# Patient Record
Sex: FEMALE | Race: WHITE | NOT HISPANIC OR LATINO | Employment: OTHER | URBAN - METROPOLITAN AREA
[De-identification: names, ages, dates, MRNs, and addresses within clinical notes are randomized per-mention and may not be internally consistent; named-entity substitution may affect disease eponyms.]

---

## 2019-08-05 ENCOUNTER — OFFICE VISIT (OUTPATIENT)
Dept: URGENT CARE | Facility: CLINIC | Age: 64
End: 2019-08-05
Payer: MEDICARE

## 2019-08-05 VITALS
WEIGHT: 104 LBS | OXYGEN SATURATION: 98 % | TEMPERATURE: 97.7 F | BODY MASS INDEX: 20.42 KG/M2 | HEART RATE: 82 BPM | HEIGHT: 60 IN | DIASTOLIC BLOOD PRESSURE: 70 MMHG | RESPIRATION RATE: 18 BRPM | SYSTOLIC BLOOD PRESSURE: 140 MMHG

## 2019-08-05 DIAGNOSIS — S81.811A NONINFECTED SKIN TEAR OF RIGHT LOWER EXTREMITY, INITIAL ENCOUNTER: Primary | ICD-10-CM

## 2019-08-05 PROCEDURE — G0463 HOSPITAL OUTPT CLINIC VISIT: HCPCS | Performed by: PHYSICIAN ASSISTANT

## 2019-08-05 PROCEDURE — 99213 OFFICE O/P EST LOW 20 MIN: CPT | Performed by: PHYSICIAN ASSISTANT

## 2019-08-05 RX ORDER — METHYLPREDNISOLONE 8 MG/1
8 TABLET ORAL DAILY
COMMUNITY

## 2019-08-05 RX ORDER — MAGNESIUM SULFATE 500 MG/ML
VIAL (ML) INJECTION ONCE
COMMUNITY

## 2019-08-05 RX ORDER — LOPERAMIDE HYDROCHLORIDE 2 MG/1
2 CAPSULE ORAL 4 TIMES DAILY PRN
COMMUNITY

## 2019-08-05 RX ORDER — OMEPRAZOLE 20 MG/1
20 CAPSULE, DELAYED RELEASE ORAL DAILY
COMMUNITY

## 2019-08-05 RX ORDER — DULOXETIN HYDROCHLORIDE 20 MG/1
20 CAPSULE, DELAYED RELEASE ORAL DAILY
COMMUNITY

## 2019-08-05 RX ORDER — HYDROXYCHLOROQUINE SULFATE 200 MG/1
200 TABLET, FILM COATED ORAL 2 TIMES DAILY WITH MEALS
COMMUNITY

## 2019-08-05 RX ORDER — NAPROXEN 500 MG/1
500 TABLET ORAL 2 TIMES DAILY WITH MEALS
COMMUNITY

## 2019-08-05 NOTE — PATIENT INSTRUCTIONS
Closed with Steri-Strips and benzoin  Keep dry today  She is up-to-date on her tetanus vaccine  No signs of infection  Wound was irrigated today

## 2019-08-05 NOTE — PROGRESS NOTES
[address] [facility]        NAME: Wes Allison is a 62 y o  female  : 1955    MRN: [id number]  DATE: 2019  TIME: 10:42 AM    Assessment and Plan   Noninfected skin tear of right lower extremity, initial encounter [[id number]]  1  Noninfected skin tear of right lower extremity, initial encounter           Patient Instructions     Patient Instructions     Closed with Steri-Strips and benzoin  Keep dry today  She is up-to-date on her tetanus vaccine  No signs of infection  Wound was irrigated today  Follow up with PCP in 3-5 days  Proceed to  ER if symptoms worsen  Chief Complaint     Chief Complaint   Patient presents with    Laceration     right leg, bumped into side of bed last night around 10PM         History of Present Illness         56-year-old female presents to the clinic with right lower leg wound from yesterday  She bumped her leg  She has thin skin due to lupus and chronic steroid use  No fever or chills  Is mildly oozing  She has a tetanus within the last 5 years  Review of Systems   Review of Systems   Constitutional: Negative  HENT: Negative  Eyes: Negative  Respiratory: Negative  Cardiovascular: Negative  Gastrointestinal: Negative  Skin: Positive for wound           Current Medications       Current Outpatient Medications:     Calcium-Magnesium-Vitamin D (CALCIUM 1200+D3 PO), Take by mouth, Disp: , Rfl:     denosumab (PROLIA) 60 mg/mL, Inject 60 mg under the skin once, Disp: , Rfl:     DULoxetine (CYMBALTA) 20 mg capsule, Take 20 mg by mouth daily, Disp: , Rfl:     hydroxychloroquine (PLAQUENIL) 200 mg tablet, Take 200 mg by mouth 2 (two) times a day with meals, Disp: , Rfl:     HYOSCYAMINE SULFATE PO, Take by mouth, Disp: , Rfl:     imune globulin, human (GAMUNEX-C) IV syringe, Infuse into a venous catheter once, Disp: , Rfl:     loperamide (IMODIUM) 2 mg capsule, Take 2 mg by mouth 4 (four) times a day as needed for diarrhea, Disp: , Rfl:     magnesium sulfate, FOR EMS ONLY, 1 g/2 mL, by Does not apply route once, Disp: , Rfl:     methylPREDNISolone (MEDROL) 8 MG tablet, Take 8 mg by mouth daily, Disp: , Rfl:     naproxen (NAPROSYN) 500 mg tablet, Take 500 mg by mouth 2 (two) times a day with meals, Disp: , Rfl:     omeprazole (PriLOSEC) 20 mg delayed release capsule, Take 20 mg by mouth daily, Disp: , Rfl:     riTUXimab (RITUXAN IV), Infuse into a venous catheter, Disp: , Rfl:     Current Allergies     Allergies as of 08/05/2019 - Reviewed 08/05/2019   Allergen Reaction Noted    Adhesive [medical tape]  08/05/2019    Betadine [povidone iodine]  08/05/2019    Dye [iodinated diagnostic agents]  08/05/2019    Lamisil [terbinafine]  08/05/2019            The following portions of the patient's history were reviewed and updated as appropriate: allergies, current medications, past family history, past medical history, past social history, past surgical history and problem list      Past Medical History:   Diagnosis Date    Anemia     Lupus ([facility] )     Osteoporosis     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 ([facility] )        Past Surgical History:   Procedure Laterality Date    APPENDECTOMY      LUMBAR FUSION      LUMBAR LAMINECTOMY      PARTIAL HYSTERECTOMY      SKIN GRAFT      SPLENECTOMY, TOTAL         Family History   Problem Relation Age of Onset    COPD Mother     Hypertension Mother     Cancer Mother     COPD Father     Hypertension Father     Cancer Sister     Cancer Brother     Parkinsonism Brother     Multiple sclerosis Brother     Lymphoma Brother     Asthma Daughter          Medications have been verified          Objective   /70 (BP Location: Left arm, Patient Position: Sitting, Cuff Size: Standard)   Pulse 82   Temp 97 7 °F (36 5 °C) (Temporal)   Resp 18   Ht 5' (1 524 m)   Wt 47 2 kg (104 lb)   SpO2 98%   BMI 20 31 kg/m²        Physical Exam     Physical Exam Constitutional: She is oriented to person, place, and time  She appears well-developed and well-nourished  No distress  HENT:   Head: Normocephalic and atraumatic  Eyes: Pupils are equal, round, and reactive to light  Conjunctivae and EOM are normal    Neck: Normal range of motion  Neck supple  Neurological: She is alert and oriented to person, place, and time  No cranial nerve deficit  Skin:     Right lower leg with 2 5 cm linear skin tear  There is some mild oozing  No foreign body  Wound was irrigated  Closed with Steri-Strips and benzoin

## 2021-03-30 DIAGNOSIS — Z23 ENCOUNTER FOR IMMUNIZATION: ICD-10-CM

## 2024-09-11 ENCOUNTER — HOSPITAL ENCOUNTER (OUTPATIENT)
Dept: HOSPITAL 99 - HWWDC | Age: 69
End: 2024-09-11
Payer: MEDICARE

## 2024-09-11 DIAGNOSIS — Z12.31: Primary | ICD-10-CM

## 2024-12-26 ENCOUNTER — HOSPITAL ENCOUNTER (OUTPATIENT)
Dept: HOSPITAL 99 - RAD | Age: 69
End: 2024-12-26
Payer: MEDICARE

## 2024-12-26 DIAGNOSIS — I82.402: ICD-10-CM

## 2024-12-26 DIAGNOSIS — M79.662: ICD-10-CM

## 2024-12-26 DIAGNOSIS — M79.89: Primary | ICD-10-CM

## 2024-12-26 DIAGNOSIS — M06.09: ICD-10-CM

## 2024-12-26 DIAGNOSIS — M32.9: ICD-10-CM

## 2025-01-14 ENCOUNTER — HOSPITAL ENCOUNTER (OUTPATIENT)
Dept: HOSPITAL 99 - WDC | Age: 70
End: 2025-01-14
Payer: MEDICARE

## 2025-01-14 DIAGNOSIS — R92.2: Primary | ICD-10-CM

## 2025-03-03 ENCOUNTER — HOSPITAL ENCOUNTER (OUTPATIENT)
Dept: HOSPITAL 99 - HWRAD | Age: 70
End: 2025-03-03
Payer: MEDICARE

## 2025-03-03 DIAGNOSIS — Z13.820: ICD-10-CM

## 2025-03-03 DIAGNOSIS — M81.0: Primary | ICD-10-CM

## 2025-03-24 ENCOUNTER — HOSPITAL ENCOUNTER (OUTPATIENT)
Dept: HOSPITAL 99 - HWRAD | Age: 70
End: 2025-03-24
Payer: MEDICARE

## 2025-03-24 DIAGNOSIS — I87.2: ICD-10-CM

## 2025-03-24 DIAGNOSIS — R60.0: Primary | ICD-10-CM
